# Patient Record
Sex: MALE
[De-identification: names, ages, dates, MRNs, and addresses within clinical notes are randomized per-mention and may not be internally consistent; named-entity substitution may affect disease eponyms.]

---

## 2019-03-06 ENCOUNTER — TRANSCRIPTION ENCOUNTER (OUTPATIENT)
Age: 24
End: 2019-03-06

## 2019-07-04 ENCOUNTER — TRANSCRIPTION ENCOUNTER (OUTPATIENT)
Age: 24
End: 2019-07-04

## 2020-10-25 ENCOUNTER — TRANSCRIPTION ENCOUNTER (OUTPATIENT)
Age: 25
End: 2020-10-25

## 2020-11-20 PROBLEM — Z00.00 ENCOUNTER FOR PREVENTIVE HEALTH EXAMINATION: Status: ACTIVE | Noted: 2020-11-20

## 2020-12-09 ENCOUNTER — APPOINTMENT (OUTPATIENT)
Dept: INFECTIOUS DISEASE | Facility: CLINIC | Age: 25
End: 2020-12-09
Payer: OTHER MISCELLANEOUS

## 2020-12-09 VITALS
HEART RATE: 50 BPM | SYSTOLIC BLOOD PRESSURE: 98 MMHG | OXYGEN SATURATION: 98 % | DIASTOLIC BLOOD PRESSURE: 70 MMHG | TEMPERATURE: 98 F

## 2020-12-09 DIAGNOSIS — A69.20 LYME DISEASE, UNSPECIFIED: ICD-10-CM
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
## 2020-12-09 PROCEDURE — 99072 ADDL SUPL MATRL&STAF TM PHE: CPT

## 2020-12-09 PROCEDURE — 99203 OFFICE O/P NEW LOW 30 MIN: CPT

## 2020-12-09 NOTE — REASON FOR VISIT
[Initial Evaluation] : an initial evaluation [FreeTextEntry1] : Pt referred here by [facility] urgent care for tick bite on 10/25/20 on posterior side of left calf. Pt reported he removed the tick and area presenting with bulls eye ring around bite. Pt started on Doxycycline and completed 2 weeks. Never had c/o fatigue, joint pain or fevers. Lymes WB & Melanie drawn 12/4/20

## 2020-12-09 NOTE — HISTORY OF PRESENT ILLNESS
[FreeTextEntry1] : 24 y/o man, , no significant PMH or PSH, who had a tick bite end of October, removed it and went to urgent care the next day with a ring like lesion around it- he was given doxycycline x 2 weeks which he completed, then was given blood work to check for Lyme to do 6 weeks later. He feels well, and denies fever or chills, no nausea, vomiting , no joint pain or swelling, no rash. His outpatient Lyme Ab test was negative. \par He is here for consultation, referred from urgent care\par

## 2020-12-09 NOTE — DATA REVIEWED
[FreeTextEntry1] : 12/4 outpatient blood work \par Negative Lyme JOSEPH and Western blot \par but had already completed 2 weeks doxycycline \par

## 2020-12-09 NOTE — ASSESSMENT
[FreeTextEntry1] : s/p tick bite with ECM rash 7 weeks ago \par s/p completing therapy with doxycycline x 2 weeks \par Lyme JOSEPH and Western blot - negative currently\par Asymptomatic\par \par Rec:\par \par 1. No further Abx needed at this time \par 2. Will complete a tick panel and check for Ehrlichia/ anaplasma/ babesia and RMSF \par 3. f/u prn \par  \par  [Risk Reduction] : risk reduction

## 2020-12-09 NOTE — PHYSICAL EXAM
[General Appearance - Alert] : alert [General Appearance - In No Acute Distress] : in no acute distress [Sclera] : the sclera and conjunctiva were normal [PERRL With Normal Accommodation] : pupils were equal in size, round, reactive to light [Extraocular Movements] : extraocular movements were intact [Outer Ear] : the ears and nose were normal in appearance [Oropharynx] : the oropharynx was normal with no thrush [Neck Appearance] : the appearance of the neck was normal [Neck Cervical Mass (___cm)] : no neck mass was observed [Jugular Venous Distention Increased] : there was no jugular-venous distention [Thyroid Diffuse Enlargement] : the thyroid was not enlarged [Auscultation Breath Sounds / Voice Sounds] : lungs were clear to auscultation bilaterally [Heart Rate And Rhythm] : heart rate was normal and rhythm regular [Heart Sounds] : normal S1 and S2 [Murmurs] : no murmurs [Edema] : there was no peripheral edema [Bowel Sounds] : normal bowel sounds [Abdomen Soft] : soft [Abdomen Tenderness] : non-tender [Abdomen Mass (___ Cm)] : no abdominal mass palpated [Costovertebral Angle Tenderness] : no CVA tenderness [Musculoskeletal - Swelling] : no joint swelling [Nail Clubbing] : no clubbing  or cyanosis of the fingernails [Motor Tone] : muscle strength and tone were normal [Skin Color & Pigmentation] : normal skin color and pigmentation [] : no rash [Deep Tendon Reflexes (DTR)] : deep tendon reflexes were 2+ and symmetric [Sensation] : the sensory exam was normal to light touch and pinprick [No Focal Deficits] : no focal deficits [Oriented To Time, Place, And Person] : oriented to person, place, and time [Affect] : the affect was normal

## 2022-01-28 ENCOUNTER — TRANSCRIPTION ENCOUNTER (OUTPATIENT)
Age: 27
End: 2022-01-28

## 2022-02-01 ENCOUNTER — TRANSCRIPTION ENCOUNTER (OUTPATIENT)
Age: 27
End: 2022-02-01